# Patient Record
Sex: MALE | Race: WHITE | NOT HISPANIC OR LATINO | Employment: FULL TIME | URBAN - METROPOLITAN AREA
[De-identification: names, ages, dates, MRNs, and addresses within clinical notes are randomized per-mention and may not be internally consistent; named-entity substitution may affect disease eponyms.]

---

## 2024-04-03 ENCOUNTER — OFFICE VISIT (OUTPATIENT)
Dept: PRIMARY CARE | Facility: CLINIC | Age: 39
End: 2024-04-03
Payer: COMMERCIAL

## 2024-04-03 ENCOUNTER — LAB (OUTPATIENT)
Dept: LAB | Facility: LAB | Age: 39
End: 2024-04-03
Payer: COMMERCIAL

## 2024-04-03 VITALS
BODY MASS INDEX: 44.21 KG/M2 | WEIGHT: 315 LBS | DIASTOLIC BLOOD PRESSURE: 90 MMHG | SYSTOLIC BLOOD PRESSURE: 140 MMHG | TEMPERATURE: 96.8 F | HEART RATE: 81 BPM | OXYGEN SATURATION: 98 %

## 2024-04-03 DIAGNOSIS — Z13.228 SCREENING FOR METABOLIC DISORDER: ICD-10-CM

## 2024-04-03 DIAGNOSIS — Z00.00 ROUTINE ADULT HEALTH MAINTENANCE: ICD-10-CM

## 2024-04-03 DIAGNOSIS — Z13.0 SCREENING FOR DEFICIENCY ANEMIA: ICD-10-CM

## 2024-04-03 DIAGNOSIS — R74.8 ELEVATED LIVER ENZYMES: ICD-10-CM

## 2024-04-03 DIAGNOSIS — G47.30 SLEEP APNEA, UNSPECIFIED TYPE: ICD-10-CM

## 2024-04-03 DIAGNOSIS — E78.1 HYPERTRIGLYCERIDEMIA: ICD-10-CM

## 2024-04-03 DIAGNOSIS — Z00.00 ROUTINE ADULT HEALTH MAINTENANCE: Primary | ICD-10-CM

## 2024-04-03 DIAGNOSIS — Z11.59 NEED FOR HEPATITIS C SCREENING TEST: ICD-10-CM

## 2024-04-03 DIAGNOSIS — R03.0 ELEVATED BLOOD PRESSURE READING WITHOUT DIAGNOSIS OF HYPERTENSION: ICD-10-CM

## 2024-04-03 DIAGNOSIS — G47.10 HYPERSOMNIA: ICD-10-CM

## 2024-04-03 PROBLEM — F41.9 ANXIETY: Status: ACTIVE | Noted: 2024-04-03

## 2024-04-03 LAB
ALBUMIN SERPL-MCNC: 4.7 G/DL (ref 3.5–5)
ALP BLD-CCNC: 60 U/L (ref 35–125)
ALT SERPL-CCNC: 65 U/L (ref 5–40)
ANION GAP SERPL CALC-SCNC: 11 MMOL/L
AST SERPL-CCNC: 36 U/L (ref 5–40)
BILIRUB SERPL-MCNC: 0.4 MG/DL (ref 0.1–1.2)
BUN SERPL-MCNC: 10 MG/DL (ref 8–25)
CALCIUM SERPL-MCNC: 9.6 MG/DL (ref 8.5–10.4)
CHLORIDE SERPL-SCNC: 104 MMOL/L (ref 97–107)
CHOLEST SERPL-MCNC: 189 MG/DL (ref 133–200)
CHOLEST/HDLC SERPL: 5.1 {RATIO}
CO2 SERPL-SCNC: 25 MMOL/L (ref 24–31)
CREAT SERPL-MCNC: 0.8 MG/DL (ref 0.4–1.6)
EGFRCR SERPLBLD CKD-EPI 2021: >90 ML/MIN/1.73M*2
ERYTHROCYTE [DISTWIDTH] IN BLOOD BY AUTOMATED COUNT: 12.4 % (ref 11.5–14.5)
GLUCOSE SERPL-MCNC: 89 MG/DL (ref 65–99)
HCT VFR BLD AUTO: 45.4 % (ref 41–52)
HCV AB SER QL: NONREACTIVE
HDLC SERPL-MCNC: 37 MG/DL
HGB BLD-MCNC: 14.9 G/DL (ref 13.5–17.5)
LDLC SERPL CALC-MCNC: 103 MG/DL (ref 65–130)
MCH RBC QN AUTO: 29.4 PG (ref 26–34)
MCHC RBC AUTO-ENTMCNC: 32.8 G/DL (ref 32–36)
MCV RBC AUTO: 90 FL (ref 80–100)
NRBC BLD-RTO: 0 /100 WBCS (ref 0–0)
PLATELET # BLD AUTO: 259 X10*3/UL (ref 150–450)
POTASSIUM SERPL-SCNC: 4.7 MMOL/L (ref 3.4–5.1)
PROT SERPL-MCNC: 8 G/DL (ref 5.9–7.9)
RBC # BLD AUTO: 5.06 X10*6/UL (ref 4.5–5.9)
SODIUM SERPL-SCNC: 140 MMOL/L (ref 133–145)
TRIGL SERPL-MCNC: 246 MG/DL (ref 40–150)
WBC # BLD AUTO: 8.4 X10*3/UL (ref 4.4–11.3)

## 2024-04-03 PROCEDURE — 86803 HEPATITIS C AB TEST: CPT

## 2024-04-03 PROCEDURE — 80061 LIPID PANEL: CPT

## 2024-04-03 PROCEDURE — 85027 COMPLETE CBC AUTOMATED: CPT

## 2024-04-03 PROCEDURE — 99395 PREV VISIT EST AGE 18-39: CPT | Performed by: FAMILY MEDICINE

## 2024-04-03 PROCEDURE — 80053 COMPREHEN METABOLIC PANEL: CPT

## 2024-04-03 PROCEDURE — 1036F TOBACCO NON-USER: CPT | Performed by: FAMILY MEDICINE

## 2024-04-03 PROCEDURE — 36415 COLL VENOUS BLD VENIPUNCTURE: CPT

## 2024-04-03 ASSESSMENT — PATIENT HEALTH QUESTIONNAIRE - PHQ9
1. LITTLE INTEREST OR PLEASURE IN DOING THINGS: NOT AT ALL
2. FEELING DOWN, DEPRESSED OR HOPELESS: NOT AT ALL
SUM OF ALL RESPONSES TO PHQ9 QUESTIONS 1 AND 2: 0

## 2024-04-03 ASSESSMENT — PROMIS GLOBAL HEALTH SCALE
CARRYOUT_SOCIAL_ACTIVITIES: GOOD
RATE_AVERAGE_PAIN: 3
RATE_SOCIAL_SATISFACTION: GOOD
RATE_QUALITY_OF_LIFE: GOOD
RATE_GENERAL_HEALTH: GOOD
CARRYOUT_PHYSICAL_ACTIVITIES: MOSTLY
EMOTIONAL_PROBLEMS: OFTEN
RATE_AVERAGE_FATIGUE: MODERATE
RATE_PHYSICAL_HEALTH: FAIR
RATE_MENTAL_HEALTH: FAIR

## 2024-04-03 ASSESSMENT — PAIN SCALES - GENERAL: PAINLEVEL: 0-NO PAIN

## 2024-04-03 NOTE — ASSESSMENT & PLAN NOTE
- Blood pressure had a high range of goal in office today  -Discussed focus on lifestyle modification in the form of healthy, balanced diet with increased physical activity with goal of weight loss  -Encouraged moderation of salt/caffeine/alcohol  -Recommend obtaining home blood pressure cuff with plans to monitor and contact office if readings are consistently above 140/90

## 2024-04-03 NOTE — ASSESSMENT & PLAN NOTE
- Will assess cholesterol panel secondary to previous elevated triglycerides  -Continue to focus on healthy, low-fat diet with moderation of carbohydrates

## 2024-04-03 NOTE — PATIENT INSTRUCTIONS
Problem List Items Addressed This Visit             ICD-10-CM    Hypersomnia G47.10    Relevant Orders    Referral to Adult Sleep Medicine    Sleep apnea G47.30     - Secondary to prior history, recommend having new consultation/sleep study with referral given         Relevant Orders    Referral to Adult Sleep Medicine    Hypertriglyceridemia E78.1     - Will assess cholesterol panel secondary to previous elevated triglycerides  -Continue to focus on healthy, low-fat diet with moderation of carbohydrates         Relevant Orders    Lipid Panel    Comprehensive Metabolic Panel    Elevated liver enzymes R74.8     - Will monitor liver enzymes with blood work ordered secondary to previous mild elevation         Relevant Orders    Lipid Panel    Comprehensive Metabolic Panel    Elevated blood pressure reading without diagnosis of hypertension R03.0     - Blood pressure had a high range of goal in office today  -Discussed focus on lifestyle modification in the form of healthy, balanced diet with increased physical activity with goal of weight loss  -Encouraged moderation of salt/caffeine/alcohol  -Recommend obtaining home blood pressure cuff with plans to monitor and contact office if readings are consistently above 140/90          Other Visit Diagnoses         Codes    Routine adult health maintenance    -  Primary Z00.00    Relevant Orders    Lipid Panel    Comprehensive Metabolic Panel    CBC    Hepatitis C antibody    Screening for deficiency anemia     Z13.0    Relevant Orders    CBC    Screening for metabolic disorder     Z13.228    Relevant Orders    Lipid Panel    Comprehensive Metabolic Panel    Need for hepatitis C screening test     Z11.59    Relevant Orders    Hepatitis C antibody            Additional Visit Plans:  - Complete history and physical examination was performed    GENERAL RECOMMENDATIONS:  - Complete review of history of physical exam completed today  - A healthy diet to maintain a normal BMI (under  25) to reduce heart disease, risk for diabetes encouraged.  - Exercising 150 minutes per week and eating healthy to reduce heart disease.  - Blood pressure screen completed.    BLOOD TESTING:  - Orders for fasting routine blood work given today, to be completed at your earliest convenience  - Will contact you with the blood work results once received and reviewed.      General Recommendations include:  - Cholesterol and diabetes screen if risk factors (overweight, high blood pressure).  - Sexually transmitted infections if risk factors.  - Hepatitis C virus screen for all adults    VACCINATIONS RECOMMENDATIONS:  - Flu shot annually - advocated seasonally  - Tetanus booster every 10 years - advocated  - Pneumonia vaccination starting at 65 years old (or earlier if risk factors - smoker, diabetic, heart or lung conditions) -not due yet  - Shingles vaccine for those 50 years or older - check with your insurance for SHINGRIX coverage and get it at your local pharmacy -not due yet  -COVID-19 vaccine series advocated    SCREENINGS RECOMMENDATIONS:  -Colon cancer screening (with colonoscopy or Cologuard) for men and women starting at age 45 until 74 years old - not due yet    Counseling:       Medication education:         Education:  The patient is counseled regarding potential side-effects of all new medications        Understanding:  Patient expressed understanding        Adherence:  No barriers to adherence identified

## 2024-04-03 NOTE — PROGRESS NOTES
Outpatient Visit Note    Chief Complaint   Patient presents with    Annual Exam     Pt here for physical exam.        HPI:  Kris Smith is a 38 y.o. male who presents to the office for annual well exam.  He was last seen on 12/17/2021 for annual well exam.  He presents today with biometric health screening form to be completed.    Last panel blood work completed in December 2021.  CBC, CMP, lipid panel and TSH, to which blood work was remarkable for mild hypertriglyceridemia and mildly elevated liver enzymes.  Healthy balanced diet/lifestyle modification was advocated.    Well exam:  Overall, he describes his health as fair with no reports of recent illness or hospitalization. He states that his diet is fair, adhering to no specific dietary regimen.  Weight has remained relatively unchanged, with 3 pound weight gain since 2021.  In regards to physical activity, he does walk over 20,000 steps a day, being very active in his job. He denies any significant sleep complaints, though he does have a history of mild sleep apnea. Had a sleep study performed many years ago secondary to witnessed apneic spells. At that time they said he was mild/borderline sleep apnea though not to the point requiring any intervention. Does admit to hypersomnolence throughout the day. He denies issues of chest pain, shortness of breath, headaches, vision/hearing changes, abdominal pain, vomiting, diarrhea, melena, hematochezia, constipation or urinary symptoms.           Separately, patient does express past history of generalized anxiety, with prominent social anxiety. Noted that symptoms have worsened throughout the COVID-19 pandemic.  Was given mental health specialist referral at last encounter.  Overall he denies any specific major depressive episodes, anxiety attacks, anhedonia, SI/HI.    Preventative Health Maintenance:  In regards to preventative health maintenance, last Tdap received unknown. Flu shot not typically  received stating to have had an adverse reaction from the flu shot received in his teenage years. COVID-19 vaccine not received to date.    Current Medications  No current outpatient medications     Allergies  No Known Allergies     Immunizations    There is no immunization history on file for this patient.     History reviewed. No pertinent past medical history.   History reviewed. No pertinent surgical history.  No family history on file.  Social History     Tobacco Use    Smoking status: Former     Types: Cigarettes     Quit date:      Years since quittin.2    Smokeless tobacco: Never   Substance Use Topics    Alcohol use: Not Currently     Comment: Occasionally    Drug use: Never       ROS  All pertinent positive symptoms are included in the history of present illness.  All other systems have been reviewed and are negative and noncontributory to this patient's current ailments.    VITAL SIGNS  Vitals:    24 1008   BP: 140/90   Pulse: 81   Temp: 36 °C (96.8 °F)   SpO2: 98%       PHYSICAL EXAM  GENERAL APPEARANCE: alert and oriented, Pleasant and cooperative, No Acute Distress.   HEENT: EOMI, PERRLA, TMs intact and flat bilaterally, patent nares, normal oropharynx, MMM  NECK: no lymphadenopathy, no thyromegaly.   HEART: RRR, normal S1S2, no murmurs, click or rubs.   LUNGS: clear to auscultation bilaterally, no wheezes/rhonchi/rales.   ABDOMEN: soft, non-tender, no organomegaly, no masses palpated, no guarding or rigidity.   EXTREMITIES: no edema, normal ROM  SKIN: normal, no rash, unremarkable.   NEUROLOGIC EXAM: non-focal exam.   MUSCULOSKELETAL: no gross abnormalities.   PSYCH: affect is normal, eye contact is good.     Assessment/Plan   Problem List Items Addressed This Visit             ICD-10-CM    Hypersomnia G47.10    Relevant Orders    Referral to Adult Sleep Medicine    Sleep apnea G47.30     - Secondary to prior history, recommend having new consultation/sleep study with referral given          Relevant Orders    Referral to Adult Sleep Medicine    Hypertriglyceridemia E78.1     - Will assess cholesterol panel secondary to previous elevated triglycerides  -Continue to focus on healthy, low-fat diet with moderation of carbohydrates         Relevant Orders    Lipid Panel    Comprehensive Metabolic Panel    Elevated liver enzymes R74.8     - Will monitor liver enzymes with blood work ordered secondary to previous mild elevation         Relevant Orders    Lipid Panel    Comprehensive Metabolic Panel    Elevated blood pressure reading without diagnosis of hypertension R03.0     - Blood pressure had a high range of goal in office today  -Discussed focus on lifestyle modification in the form of healthy, balanced diet with increased physical activity with goal of weight loss  -Encouraged moderation of salt/caffeine/alcohol  -Recommend obtaining home blood pressure cuff with plans to monitor and contact office if readings are consistently above 140/90          Other Visit Diagnoses         Codes    Routine adult health maintenance    -  Primary Z00.00    Relevant Orders    Lipid Panel    Comprehensive Metabolic Panel    CBC    Hepatitis C antibody    Screening for deficiency anemia     Z13.0    Relevant Orders    CBC    Screening for metabolic disorder     Z13.228    Relevant Orders    Lipid Panel    Comprehensive Metabolic Panel    Need for hepatitis C screening test     Z11.59    Relevant Orders    Hepatitis C antibody            Additional Visit Plans:  - Complete history and physical examination was performed    GENERAL RECOMMENDATIONS:  - Complete review of history of physical exam completed today  - A healthy diet to maintain a normal BMI (under 25) to reduce heart disease, risk for diabetes encouraged.  - Exercising 150 minutes per week and eating healthy to reduce heart disease.  - Blood pressure screen completed.    BLOOD TESTING:  - Orders for fasting routine blood work given today, to be completed  at your earliest convenience  - Will contact you with the blood work results once received and reviewed.      General Recommendations include:  - Cholesterol and diabetes screen if risk factors (overweight, high blood pressure).  - Sexually transmitted infections if risk factors.  - Hepatitis C virus screen for all adults    VACCINATIONS RECOMMENDATIONS:  - Flu shot annually - advocated seasonally  - Tetanus booster every 10 years - advocated  - Pneumonia vaccination starting at 65 years old (or earlier if risk factors - smoker, diabetic, heart or lung conditions) -not due yet  - Shingles vaccine for those 50 years or older - check with your insurance for SHINGRIX coverage and get it at your local pharmacy -not due yet  -COVID-19 vaccine series advocated    SCREENINGS RECOMMENDATIONS:  -Colon cancer screening (with colonoscopy or Cologuard) for men and women starting at age 45 until 74 years old - not due yet    Counseling:       Medication education:         Education:  The patient is counseled regarding potential side-effects of all new medications        Understanding:  Patient expressed understanding        Adherence:  No barriers to adherence identified

## 2024-07-31 ENCOUNTER — OFFICE VISIT (OUTPATIENT)
Dept: PRIMARY CARE | Facility: CLINIC | Age: 39
End: 2024-07-31
Payer: COMMERCIAL

## 2024-07-31 VITALS
SYSTOLIC BLOOD PRESSURE: 132 MMHG | HEART RATE: 78 BPM | DIASTOLIC BLOOD PRESSURE: 88 MMHG | HEIGHT: 70 IN | WEIGHT: 315 LBS | TEMPERATURE: 95.8 F | BODY MASS INDEX: 45.1 KG/M2 | OXYGEN SATURATION: 97 %

## 2024-07-31 DIAGNOSIS — M25.521 RIGHT ELBOW PAIN: ICD-10-CM

## 2024-07-31 DIAGNOSIS — B35.6 TINEA CRURIS: ICD-10-CM

## 2024-07-31 DIAGNOSIS — Z78.9 WEIGHT LOSS ADVISED: ICD-10-CM

## 2024-07-31 PROCEDURE — 3008F BODY MASS INDEX DOCD: CPT | Performed by: FAMILY MEDICINE

## 2024-07-31 PROCEDURE — 99214 OFFICE O/P EST MOD 30 MIN: CPT | Performed by: FAMILY MEDICINE

## 2024-07-31 RX ORDER — NYSTATIN 100000 [USP'U]/G
1 POWDER TOPICAL 2 TIMES DAILY
Qty: 60 G | Refills: 2 | Status: SHIPPED | OUTPATIENT
Start: 2024-07-31 | End: 2025-07-31

## 2024-07-31 RX ORDER — DICLOFENAC SODIUM 75 MG/1
75 TABLET, DELAYED RELEASE ORAL 2 TIMES DAILY PRN
Qty: 60 TABLET | Refills: 0 | Status: SHIPPED | OUTPATIENT
Start: 2024-07-31 | End: 2024-09-29

## 2024-07-31 ASSESSMENT — PAIN SCALES - GENERAL: PAINLEVEL: 3

## 2024-07-31 ASSESSMENT — PATIENT HEALTH QUESTIONNAIRE - PHQ9
SUM OF ALL RESPONSES TO PHQ9 QUESTIONS 1 AND 2: 0
1. LITTLE INTEREST OR PLEASURE IN DOING THINGS: NOT AT ALL
2. FEELING DOWN, DEPRESSED OR HOPELESS: NOT AT ALL

## 2024-07-31 NOTE — PATIENT INSTRUCTIONS
Problem List Items Addressed This Visit             ICD-10-CM    Adult BMI 45.0-49.9 kg/sq m (Multi) - Primary Z68.42     - With ongoing weight struggles despite attempts at lifestyle modification, will plan to initiate potential trial of weight loss medication as well as set up for possible consultation with bariatrics  -Continue to focus on healthy, low-fat diet with moderation of carbohydrates/calories and regular physical activity         Relevant Medications    tirzepatide, weight loss, (Zepbound) 2.5 mg/0.5 mL injection    Other Relevant Orders    Referral to Bariatric Surgery    Tinea cruris B35.6     - Will try nystatin powder which can often have wicking affect         Relevant Medications    nystatin (Mycostatin) 100,000 unit/gram powder    Right elbow pain M25.521     - Symptoms seem musculoskeletal in nature to which we will attempt trial of topical anti-inflammatory Voltaren along with supportive care including ice/heat and rest         Relevant Medications    diclofenac (Voltaren) 75 mg EC tablet     Other Visit Diagnoses         Codes    Weight loss advised     Z78.9    Relevant Medications    tirzepatide, weight loss, (Zepbound) 2.5 mg/0.5 mL injection            Counseling:       Medication education:         Education:  The patient is counseled regarding potential side-effects of all new medications        Understanding:  Patient expressed understanding        Adherence:  No barriers to adherence identified    ** Please excuse any errors in grammar or translation related to this dictation. Voice recognition software was utilized to prepare this document. **

## 2024-07-31 NOTE — PROGRESS NOTES
Outpatient Visit Note    Chief Complaint   Patient presents with    Weight Loss     Discuss weight loss options         HPI:  Krsi Smith is a 38 y.o. male who presents to the office to discuss weight loss surgery options.  He was last seen in the office on 4/3/2024 for annual well exam.  Prior to this he had been seen on 12/17/2021 for annual well exam.      Last panel blood work completed on 4/3/2024 including CBC, CMP, lipid panel and hepatitis C screening.  Blood work was remarkable for mildly elevated ALT level and hypertriglyceridemia to which patient was encouraged to diligently focus on low-fat diet with moderation of carbohydrates    At well exam he described his health as fair with no reports of recent illness or hospitalization. He stated that his diet was fair, denying adherence to any specific dietary regimen.  Weight had remained relatively unchanged, with 3 pound weight gain since 2021.  In regards to physical activity, he does walk over 20,000 steps a day, being very active in his job though he was not able to have any significant success with weight loss. He denied any significant sleep complaints, though he does have a history of mild sleep apnea. Had a sleep study performed many years ago secondary to witnessed apneic spells. At that time they said he was mild/borderline sleep apnea though not to the point requiring any intervention.  Admitted to hypersomnolence throughout the day, to which recommendation was given to undergo formal sleep assessment.     Today he reports ongoing struggles with his weight despite attempts at dietary modification.  States that this is contributed towards significant social anxiety.  He continues to be very physically active though this does not seem to have significant impacts on his goals of wanting to lose weight.  Expresses interest in pursuing options such as medical weight loss as well as consultation for bariatric surgery.    Of note, patient has  been dealing with recurrent fungal infection in his groin area.  Has attempted over-the-counter topicals with no significant relief.  He also notes recurrent right elbow pain stating to have likely overused the joint with persisting discomfort secondary to repetitive activities.    Preventative Health Maintenance:  In regards to preventative health maintenance, last Tdap received unknown. Flu shot not typically received stating to have had an adverse reaction from the flu shot received in his teenage years. COVID-19 vaccine not received to date.      Current Medications  Current Outpatient Medications   Medication Instructions    diclofenac (VOLTAREN) 75 mg, oral, 2 times daily PRN, Do not crush, chew, or split.    nystatin (Mycostatin) 100,000 unit/gram powder 1 Application, Topical, 2 times daily    tirzepatide (weight loss) (ZEPBOUND) 2.5 mg, subcutaneous, Every 7 days        Allergies  No Known Allergies     History reviewed. No pertinent past medical history.   History reviewed. No pertinent surgical history.  No family history on file.  Social History     Tobacco Use    Smoking status: Former     Current packs/day: 0.00     Types: Cigarettes     Quit date:      Years since quittin.5    Smokeless tobacco: Never   Vaping Use    Vaping status: Never Used   Substance Use Topics    Alcohol use: Not Currently     Comment: Occasionally    Drug use: Never       ROS  All pertinent positive symptoms are included in the history of present illness.  All other systems have been reviewed and are negative and noncontributory to this patient's current ailments.      VITAL SIGNS  Vitals:    24 1052   BP: 132/88   Pulse: 78   Temp: 35.4 °C (95.8 °F)   SpO2: 97%       PHYSICAL EXAM  GENERAL APPEARANCE: alert and oriented, Pleasant and cooperative, No Acute Distress  HEENT: EOMI, PERRLA, MMM  HEART: RRR, normal S1S2, no murmurs, click or rubs  LUNGS: clear to auscultation bilaterally, no  wheezes/rhonchi/rales  EXTREMITIES: no edema, normal ROM  SKIN: normal, no rash, unremarkable  NEUROLOGIC EXAM: non-focal exam  MUSCULOSKELETAL: no gross abnormalities  PSYCH: affect is normal, eye contact is good    Assessment/Plan   Problem List Items Addressed This Visit             ICD-10-CM    Adult BMI 45.0-49.9 kg/sq m (Multi) - Primary Z68.42     - With ongoing weight struggles despite attempts at lifestyle modification, will plan to initiate potential trial of weight loss medication as well as set up for possible consultation with bariatrics  -Continue to focus on healthy, low-fat diet with moderation of carbohydrates/calories and regular physical activity         Relevant Medications    tirzepatide, weight loss, (Zepbound) 2.5 mg/0.5 mL injection    Other Relevant Orders    Referral to Bariatric Surgery    Tinea cruris B35.6     - Will try nystatin powder which can often have wicking affect         Relevant Medications    nystatin (Mycostatin) 100,000 unit/gram powder    Right elbow pain M25.521     - Symptoms seem musculoskeletal in nature to which we will attempt trial of topical anti-inflammatory Voltaren along with supportive care including ice/heat and rest         Relevant Medications    diclofenac (Voltaren) 75 mg EC tablet     Other Visit Diagnoses         Codes    Weight loss advised     Z78.9    Relevant Medications    tirzepatide, weight loss, (Zepbound) 2.5 mg/0.5 mL injection            Counseling:       Medication education:         Education:  The patient is counseled regarding potential side-effects of all new medications        Understanding:  Patient expressed understanding        Adherence:  No barriers to adherence identified    ** Please excuse any errors in grammar or translation related to this dictation. Voice recognition software was utilized to prepare this document. **

## 2024-08-01 ENCOUNTER — TELEPHONE (OUTPATIENT)
Dept: SURGERY | Facility: CLINIC | Age: 39
End: 2024-08-01
Payer: COMMERCIAL

## 2024-08-01 PROBLEM — B35.6 TINEA CRURIS: Status: ACTIVE | Noted: 2024-08-01

## 2024-08-01 PROBLEM — M25.521 RIGHT ELBOW PAIN: Status: ACTIVE | Noted: 2024-08-01

## 2024-08-01 NOTE — ASSESSMENT & PLAN NOTE
- Symptoms seem musculoskeletal in nature to which we will attempt trial of topical anti-inflammatory Voltaren along with supportive care including ice/heat and rest

## 2024-08-01 NOTE — ASSESSMENT & PLAN NOTE
- With ongoing weight struggles despite attempts at lifestyle modification, will plan to initiate potential trial of weight loss medication as well as set up for possible consultation with bariatrics  -Continue to focus on healthy, low-fat diet with moderation of carbohydrates/calories and regular physical activity

## 2024-08-08 ENCOUNTER — TELEPHONE (OUTPATIENT)
Dept: SURGERY | Facility: CLINIC | Age: 39
End: 2024-08-08
Payer: COMMERCIAL

## 2024-08-28 ENCOUNTER — OFFICE VISIT (OUTPATIENT)
Dept: PRIMARY CARE | Facility: CLINIC | Age: 39
End: 2024-08-28
Payer: COMMERCIAL

## 2024-08-28 VITALS
OXYGEN SATURATION: 97 % | HEART RATE: 83 BPM | TEMPERATURE: 96.4 F | WEIGHT: 315 LBS | HEIGHT: 70 IN | SYSTOLIC BLOOD PRESSURE: 122 MMHG | DIASTOLIC BLOOD PRESSURE: 72 MMHG | BODY MASS INDEX: 45.1 KG/M2

## 2024-08-28 DIAGNOSIS — E66.01 CLASS 3 SEVERE OBESITY WITHOUT SERIOUS COMORBIDITY WITH BODY MASS INDEX (BMI) OF 45.0 TO 49.9 IN ADULT, UNSPECIFIED OBESITY TYPE (MULTI): Primary | ICD-10-CM

## 2024-08-28 DIAGNOSIS — Z78.9 WEIGHT LOSS ADVISED: ICD-10-CM

## 2024-08-28 PROCEDURE — 99214 OFFICE O/P EST MOD 30 MIN: CPT | Performed by: FAMILY MEDICINE

## 2024-08-28 PROCEDURE — 1036F TOBACCO NON-USER: CPT | Performed by: FAMILY MEDICINE

## 2024-08-28 PROCEDURE — 3008F BODY MASS INDEX DOCD: CPT | Performed by: FAMILY MEDICINE

## 2024-08-28 ASSESSMENT — PAIN SCALES - GENERAL: PAINLEVEL: 0-NO PAIN

## 2024-08-28 NOTE — PROGRESS NOTES
Outpatient Visit Note    Chief Complaint   Patient presents with    Follow-up     Zepbound f/u         HPI:  Kris Smith is a 38 y.o. male who presents to the office for medication follow-up.  He was last seen in the office on 7/31/2024 to discuss medical weight loss options.    Last panel blood work completed on 4/3/2024 including CBC, CMP, lipid panel and hepatitis C screening.  Blood work was remarkable for mildly elevated ALT level and hypertriglyceridemia to which patient was encouraged to diligently focus on low-fat diet with moderation of carbohydrates    At well exam he described his health as fair with no reports of recent illness or hospitalization. He stated that his diet was fair, denying adherence to any specific dietary regimen.  Weight had remained relatively unchanged, with 3 pound weight gain since 2021.  In regards to physical activity, he does walk over 20,000 steps a day, being very active in his job though he was not able to have any significant success with weight loss. He denied any significant sleep complaints, though he does have a history of mild sleep apnea. Had a sleep study performed many years ago secondary to witnessed apneic spells. At that time they said he was mild/borderline sleep apnea though not to the point requiring any intervention.  Admitted to hypersomnolence throughout the day, to which recommendation was given to undergo formal sleep assessment.     At last encounter he reported ongoing struggles with his weight despite attempts at dietary modification.  Stated that this is contributed towards significant social anxiety.  He continued to be very physically active though this did not seem to have significant impacts on his goals of wanting to lose weight.  Expressed interest in pursuing options such as medical weight loss as well as consultation for bariatric surgery, with referral given.  Plan was ultimately sent to initiate medical weight loss therapy in the  form of Zepbound.  He reports to have had good success with starting medication denying any abdominal pain or nausea.  Notes improvement in his appetite with cravings curved.  Does feel that the medication wears off after several days to which he is interested in increasing dose.  Has continue to focus on dietary modification.  11 pound weight loss noted since last visit    Preventative Health Maintenance:  In regards to preventative health maintenance, last Tdap received unknown. Flu shot not typically received stating to have had an adverse reaction from the flu shot received in his teenage years. COVID-19 vaccine not received to date.      Current Medications  Current Outpatient Medications   Medication Instructions    diclofenac (VOLTAREN) 75 mg, oral, 2 times daily PRN, Do not crush, chew, or split.    nystatin (Mycostatin) 100,000 unit/gram powder 1 Application, Topical, 2 times daily    tirzepatide (weight loss) (ZEPBOUND) 2.5 mg, subcutaneous, Every 7 days    tirzepatide (weight loss) (ZEPBOUND) 5 mg, subcutaneous, Every 7 days        Allergies  No Known Allergies     Past Medical History:   Diagnosis Date    Anxiety     Depression     Hypertension       History reviewed. No pertinent surgical history.  Family History   Problem Relation Name Age of Onset    Cancer Mother Zoila Smith     Hypertension Mother Zoila Smith     COPD Father Andrzej Smith     Depression Father Andrzej Smith      Social History     Tobacco Use    Smoking status: Former     Current packs/day: 0.00     Average packs/day: 1 pack/day for 10.0 years (10.0 ttl pk-yrs)     Types: Cigarettes     Start date: 1/1/2003     Quit date: 12/1/2013     Years since quitting: 10.7    Smokeless tobacco: Never   Vaping Use    Vaping status: Never Used   Substance Use Topics    Alcohol use: Yes     Alcohol/week: 6.0 standard drinks of alcohol     Types: 4 Cans of beer, 2 Shots of liquor per week     Comment: Occasionally    Drug use: Never        ROS  All pertinent positive symptoms are included in the history of present illness.  All other systems have been reviewed and are negative and noncontributory to this patient's current ailments.      VITAL SIGNS  Vitals:    08/28/24 1137   BP: 122/72   Pulse: 83   Temp: 35.8 °C (96.4 °F)   SpO2: 97%         PHYSICAL EXAM  GENERAL APPEARANCE: alert and oriented, Pleasant and cooperative, No Acute Distress  HEENT: EOMI, PERRLA, MMM  EXTREMITIES: no edema, normal ROM  SKIN: normal, no rash, unremarkable  NEUROLOGIC EXAM: non-focal exam  MUSCULOSKELETAL: no gross abnormalities  PSYCH: affect is normal, eye contact is good    Assessment/Plan   Problem List Items Addressed This Visit             ICD-10-CM    Adult BMI 45.0-49.9 kg/sq m (Multi) Z68.42     - Very happy to hear that the medication has been tolerated thus far to which we will plan to increase the next dose  -Please contact office after 3 weeks of new dose to let us know how you are doing and see if further dose adjustments are to be made.  If there are any adverse side effects or concerns, please do not hesitate to reach out  -Continue to focus on healthy, balanced diet with moderation of carbohydrates and regular physical activity         Relevant Medications    tirzepatide, weight loss, (Zepbound) 5 mg/0.5 mL injection    Class 3 severe obesity without serious comorbidity with body mass index (BMI) of 45.0 to 49.9 in adult (Multi) - Primary E66.01, Z68.42     Other Visit Diagnoses         Codes    Weight loss advised     Z78.9    Relevant Medications    tirzepatide, weight loss, (Zepbound) 5 mg/0.5 mL injection            Counseling:       Medication education:         Education:  The patient is counseled regarding potential side-effects of all new medications        Understanding:  Patient expressed understanding        Adherence:  No barriers to adherence identified    ** Please excuse any errors in grammar or translation related to this dictation.  Voice recognition software was utilized to prepare this document. **

## 2024-08-28 NOTE — ASSESSMENT & PLAN NOTE
- Very happy to hear that the medication has been tolerated thus far to which we will plan to increase the next dose  -Please contact office after 3 weeks of new dose to let us know how you are doing and see if further dose adjustments are to be made.  If there are any adverse side effects or concerns, please do not hesitate to reach out  -Continue to focus on healthy, balanced diet with moderation of carbohydrates and regular physical activity

## 2024-08-29 PROBLEM — E66.01 CLASS 3 SEVERE OBESITY WITHOUT SERIOUS COMORBIDITY WITH BODY MASS INDEX (BMI) OF 45.0 TO 49.9 IN ADULT (MULTI): Status: ACTIVE | Noted: 2024-08-29

## 2024-08-29 PROBLEM — E66.813 CLASS 3 SEVERE OBESITY WITHOUT SERIOUS COMORBIDITY WITH BODY MASS INDEX (BMI) OF 45.0 TO 49.9 IN ADULT: Status: ACTIVE | Noted: 2024-08-29

## 2024-09-19 ENCOUNTER — PATIENT MESSAGE (OUTPATIENT)
Dept: PRIMARY CARE | Facility: CLINIC | Age: 39
End: 2024-09-19
Payer: COMMERCIAL

## 2024-09-19 DIAGNOSIS — Z78.9 WEIGHT LOSS ADVISED: ICD-10-CM

## 2024-10-18 ENCOUNTER — PATIENT MESSAGE (OUTPATIENT)
Dept: PRIMARY CARE | Facility: CLINIC | Age: 39
End: 2024-10-18
Payer: COMMERCIAL

## 2024-10-18 DIAGNOSIS — Z13.21 ENCOUNTER FOR VITAMIN DEFICIENCY SCREENING: ICD-10-CM

## 2024-10-18 DIAGNOSIS — Z13.228 SCREENING FOR METABOLIC DISORDER: ICD-10-CM

## 2024-10-18 DIAGNOSIS — E78.1 HYPERTRIGLYCERIDEMIA: Primary | ICD-10-CM

## 2024-10-18 DIAGNOSIS — Z13.0 SCREENING FOR DEFICIENCY ANEMIA: ICD-10-CM

## 2024-10-19 ENCOUNTER — LAB (OUTPATIENT)
Dept: LAB | Facility: LAB | Age: 39
End: 2024-10-19
Payer: COMMERCIAL

## 2024-10-19 DIAGNOSIS — Z13.228 SCREENING FOR METABOLIC DISORDER: ICD-10-CM

## 2024-10-19 DIAGNOSIS — Z13.21 ENCOUNTER FOR VITAMIN DEFICIENCY SCREENING: ICD-10-CM

## 2024-10-19 DIAGNOSIS — E78.1 HYPERTRIGLYCERIDEMIA: ICD-10-CM

## 2024-10-19 LAB
ALBUMIN SERPL BCP-MCNC: 4.5 G/DL (ref 3.4–5)
ALP SERPL-CCNC: 51 U/L (ref 33–120)
ALT SERPL W P-5'-P-CCNC: 34 U/L (ref 10–52)
ANION GAP SERPL CALCULATED.3IONS-SCNC: 9 MMOL/L (ref 10–20)
AST SERPL W P-5'-P-CCNC: 19 U/L (ref 9–39)
BILIRUB SERPL-MCNC: 0.6 MG/DL (ref 0–1.2)
BUN SERPL-MCNC: 7 MG/DL (ref 6–23)
CALCIUM SERPL-MCNC: 9.3 MG/DL (ref 8.6–10.3)
CHLORIDE SERPL-SCNC: 107 MMOL/L (ref 98–107)
CO2 SERPL-SCNC: 27 MMOL/L (ref 21–32)
CREAT SERPL-MCNC: 0.74 MG/DL (ref 0.5–1.3)
EGFRCR SERPLBLD CKD-EPI 2021: >90 ML/MIN/1.73M*2
GLUCOSE SERPL-MCNC: 84 MG/DL (ref 74–99)
LIPASE SERPL-CCNC: 48 U/L (ref 9–82)
POTASSIUM SERPL-SCNC: 4.3 MMOL/L (ref 3.5–5.3)
PROT SERPL-MCNC: 7.2 G/DL (ref 6.4–8.2)
SODIUM SERPL-SCNC: 139 MMOL/L (ref 136–145)
TSH SERPL-ACNC: 1.2 MIU/L (ref 0.44–3.98)
VIT B12 SERPL-MCNC: 377 PG/ML (ref 211–911)

## 2024-10-19 PROCEDURE — 80053 COMPREHEN METABOLIC PANEL: CPT

## 2024-10-19 PROCEDURE — 36415 COLL VENOUS BLD VENIPUNCTURE: CPT

## 2024-10-19 PROCEDURE — 84443 ASSAY THYROID STIM HORMONE: CPT

## 2024-10-19 PROCEDURE — 83690 ASSAY OF LIPASE: CPT

## 2024-10-19 PROCEDURE — 82607 VITAMIN B-12: CPT

## 2024-11-15 ENCOUNTER — PATIENT MESSAGE (OUTPATIENT)
Dept: PRIMARY CARE | Facility: CLINIC | Age: 39
End: 2024-11-15
Payer: COMMERCIAL

## 2024-11-15 DIAGNOSIS — E66.01 CLASS 3 SEVERE OBESITY WITHOUT SERIOUS COMORBIDITY WITH BODY MASS INDEX (BMI) OF 45.0 TO 49.9 IN ADULT, UNSPECIFIED OBESITY TYPE: Primary | ICD-10-CM

## 2024-11-15 DIAGNOSIS — E66.813 CLASS 3 SEVERE OBESITY WITHOUT SERIOUS COMORBIDITY WITH BODY MASS INDEX (BMI) OF 45.0 TO 49.9 IN ADULT, UNSPECIFIED OBESITY TYPE: Primary | ICD-10-CM

## 2024-11-15 DIAGNOSIS — Z78.9 WEIGHT LOSS ADVISED: ICD-10-CM

## 2024-12-16 ENCOUNTER — PATIENT MESSAGE (OUTPATIENT)
Dept: PRIMARY CARE | Facility: CLINIC | Age: 39
End: 2024-12-16
Payer: COMMERCIAL

## 2024-12-16 DIAGNOSIS — L20.9 ATOPIC DERMATITIS, UNSPECIFIED TYPE: ICD-10-CM

## 2024-12-16 DIAGNOSIS — Z78.9 WEIGHT LOSS ADVISED: ICD-10-CM

## 2024-12-16 DIAGNOSIS — E66.01 CLASS 3 SEVERE OBESITY WITHOUT SERIOUS COMORBIDITY WITH BODY MASS INDEX (BMI) OF 45.0 TO 49.9 IN ADULT, UNSPECIFIED OBESITY TYPE: ICD-10-CM

## 2024-12-16 DIAGNOSIS — E66.813 CLASS 3 SEVERE OBESITY WITHOUT SERIOUS COMORBIDITY WITH BODY MASS INDEX (BMI) OF 45.0 TO 49.9 IN ADULT, UNSPECIFIED OBESITY TYPE: ICD-10-CM

## 2024-12-16 DIAGNOSIS — K21.9 GASTROESOPHAGEAL REFLUX DISEASE, UNSPECIFIED WHETHER ESOPHAGITIS PRESENT: ICD-10-CM

## 2024-12-16 RX ORDER — MAG HYDROX/ALUMINUM HYD/SIMETH 200-200-20
SUSPENSION, ORAL (FINAL DOSE FORM) ORAL 2 TIMES DAILY
Qty: 56 G | Refills: 0 | Status: SHIPPED | OUTPATIENT
Start: 2024-12-16 | End: 2025-01-13

## 2024-12-16 RX ORDER — PANTOPRAZOLE SODIUM 40 MG/1
40 TABLET, DELAYED RELEASE ORAL DAILY
Qty: 90 TABLET | Refills: 0 | Status: SHIPPED | OUTPATIENT
Start: 2024-12-16 | End: 2025-03-16

## 2025-01-06 ENCOUNTER — TELEPHONE (OUTPATIENT)
Dept: PRIMARY CARE | Facility: CLINIC | Age: 40
End: 2025-01-06
Payer: COMMERCIAL

## 2025-01-17 ENCOUNTER — PATIENT MESSAGE (OUTPATIENT)
Dept: PRIMARY CARE | Facility: CLINIC | Age: 40
End: 2025-01-17
Payer: COMMERCIAL

## 2025-01-17 DIAGNOSIS — E66.01 CLASS 3 SEVERE OBESITY WITHOUT SERIOUS COMORBIDITY WITH BODY MASS INDEX (BMI) OF 45.0 TO 49.9 IN ADULT, UNSPECIFIED OBESITY TYPE: ICD-10-CM

## 2025-01-17 DIAGNOSIS — Z78.9 WEIGHT LOSS ADVISED: ICD-10-CM

## 2025-01-17 DIAGNOSIS — E66.813 CLASS 3 SEVERE OBESITY WITHOUT SERIOUS COMORBIDITY WITH BODY MASS INDEX (BMI) OF 45.0 TO 49.9 IN ADULT, UNSPECIFIED OBESITY TYPE: ICD-10-CM

## 2025-02-12 ENCOUNTER — PATIENT MESSAGE (OUTPATIENT)
Dept: PRIMARY CARE | Facility: CLINIC | Age: 40
End: 2025-02-12
Payer: COMMERCIAL

## 2025-02-12 DIAGNOSIS — E66.813 CLASS 3 SEVERE OBESITY WITHOUT SERIOUS COMORBIDITY WITH BODY MASS INDEX (BMI) OF 45.0 TO 49.9 IN ADULT, UNSPECIFIED OBESITY TYPE: ICD-10-CM

## 2025-02-12 DIAGNOSIS — E66.01 CLASS 3 SEVERE OBESITY WITHOUT SERIOUS COMORBIDITY WITH BODY MASS INDEX (BMI) OF 45.0 TO 49.9 IN ADULT, UNSPECIFIED OBESITY TYPE: ICD-10-CM

## 2025-02-12 DIAGNOSIS — Z78.9 WEIGHT LOSS ADVISED: ICD-10-CM

## 2025-03-10 ENCOUNTER — OFFICE VISIT (OUTPATIENT)
Dept: PRIMARY CARE | Facility: CLINIC | Age: 40
End: 2025-03-10
Payer: COMMERCIAL

## 2025-03-10 VITALS
HEART RATE: 78 BPM | SYSTOLIC BLOOD PRESSURE: 124 MMHG | WEIGHT: 263 LBS | TEMPERATURE: 98 F | HEIGHT: 70 IN | OXYGEN SATURATION: 97 % | BODY MASS INDEX: 37.65 KG/M2 | DIASTOLIC BLOOD PRESSURE: 74 MMHG

## 2025-03-10 DIAGNOSIS — E78.1 HYPERTRIGLYCERIDEMIA: ICD-10-CM

## 2025-03-10 DIAGNOSIS — Z78.9 WEIGHT LOSS ADVISED: ICD-10-CM

## 2025-03-10 DIAGNOSIS — R74.8 ELEVATED LIVER ENZYMES: ICD-10-CM

## 2025-03-10 DIAGNOSIS — K21.9 GASTROESOPHAGEAL REFLUX DISEASE, UNSPECIFIED WHETHER ESOPHAGITIS PRESENT: ICD-10-CM

## 2025-03-10 DIAGNOSIS — E66.812 CLASS 2 OBESITY DUE TO EXCESS CALORIES WITHOUT SERIOUS COMORBIDITY WITH BODY MASS INDEX (BMI) OF 37.0 TO 37.9 IN ADULT: ICD-10-CM

## 2025-03-10 DIAGNOSIS — E66.09 CLASS 2 OBESITY DUE TO EXCESS CALORIES WITHOUT SERIOUS COMORBIDITY WITH BODY MASS INDEX (BMI) OF 37.0 TO 37.9 IN ADULT: ICD-10-CM

## 2025-03-10 DIAGNOSIS — Z00.00 ROUTINE ADULT HEALTH MAINTENANCE: Primary | ICD-10-CM

## 2025-03-10 PROCEDURE — 1036F TOBACCO NON-USER: CPT | Performed by: FAMILY MEDICINE

## 2025-03-10 PROCEDURE — 3008F BODY MASS INDEX DOCD: CPT | Performed by: FAMILY MEDICINE

## 2025-03-10 PROCEDURE — 99395 PREV VISIT EST AGE 18-39: CPT | Performed by: FAMILY MEDICINE

## 2025-03-10 RX ORDER — PANTOPRAZOLE SODIUM 40 MG/1
40 TABLET, DELAYED RELEASE ORAL DAILY
Qty: 90 TABLET | Refills: 0 | Status: SHIPPED | OUTPATIENT
Start: 2025-03-10 | End: 2025-06-08

## 2025-03-10 ASSESSMENT — PATIENT HEALTH QUESTIONNAIRE - PHQ9
2. FEELING DOWN, DEPRESSED OR HOPELESS: NOT AT ALL
1. LITTLE INTEREST OR PLEASURE IN DOING THINGS: NOT AT ALL
SUM OF ALL RESPONSES TO PHQ9 QUESTIONS 1 AND 2: 0

## 2025-03-10 ASSESSMENT — PROMIS GLOBAL HEALTH SCALE
CARRYOUT_PHYSICAL_ACTIVITIES: COMPLETELY
RATE_SOCIAL_SATISFACTION: VERY GOOD
RATE_PHYSICAL_HEALTH: GOOD
RATE_GENERAL_HEALTH: GOOD
RATE_AVERAGE_FATIGUE: MODERATE
RATE_MENTAL_HEALTH: VERY GOOD
CARRYOUT_SOCIAL_ACTIVITIES: VERY GOOD
RATE_AVERAGE_PAIN: 2
EMOTIONAL_PROBLEMS: RARELY
RATE_QUALITY_OF_LIFE: VERY GOOD

## 2025-03-10 ASSESSMENT — PAIN SCALES - GENERAL: PAINLEVEL_OUTOF10: 2

## 2025-03-10 NOTE — ASSESSMENT & PLAN NOTE
- I am very happy to hear that the medication has been tolerated  -Continue to focus on healthy, balanced diet with moderation of carbohydrates and regular physical activity

## 2025-03-10 NOTE — ASSESSMENT & PLAN NOTE
- Generally stable with plans to send prescription refill on pantoprazole to use if recurrent symptoms

## 2025-03-10 NOTE — PROGRESS NOTES
Outpatient Visit Note    Chief Complaint   Patient presents with    Annual Exam       HPI:  Kris Smith is a 39 y.o. male who presents to the office for annual well exam/medication follow-up He was last seen in the office in August 2024 for follow-up.    Last panel blood work completed on 10/19/2024 including CMP, TSH, vitamin B12 and lipase.  Blood work was generally unremarkable with normalized liver enzymes    Well Exam:  Overall, they describe their health as good with no reports of recent illness or hospitalization. Denies issues of chest pain, shortness of breath, headaches, vision/hearing changes, abdominal pain, vomiting, diarrhea, melena, hematochezia, constipation or urinary symptoms.    At prior encounter he reported ongoing struggles with his weight despite attempts at dietary modification, to which patient was ultimately initiated on Zepbound which has been systematically titrated to 15 mg daily.  Patient's weight and physical exam in April 2024 was 326, with noted 55 pound loss while utilizing medication. He reports to have had good success with starting medication denying any abdominal pain or nausea.  Notes improvement in his appetite with cravings curved.    Preventative Health Maintenance:  In regards to preventative health maintenance, last Tdap received unknown. Flu shot not typically received stating to have had an adverse reaction from the flu shot received in his teenage years. COVID-19 vaccine not received to date.    Current Medications  Current Outpatient Medications   Medication Instructions    hydrocortisone 1 % ointment Topical, 2 times daily    pantoprazole (PROTONIX) 40 mg, oral, Daily, Do not crush, chew, or split.    tirzepatide (weight loss) (ZEPBOUND) 15 mg, subcutaneous, Every 7 days        Allergies  No Known Allergies     Immunizations    There is no immunization history on file for this patient.     Past Medical History:   Diagnosis Date    Anxiety     Depression      Hypertension       History reviewed. No pertinent surgical history.  Family History   Problem Relation Name Age of Onset    Cancer Mother Zoila Smith     Hypertension Mother Zoila Smith     COPD Father Andrzej Smith     Depression Father Andrzej Smith      Social History     Tobacco Use    Smoking status: Former     Current packs/day: 0.00     Average packs/day: 1 pack/day for 10.0 years (10.0 ttl pk-yrs)     Types: Cigarettes     Start date: 2003     Quit date: 2013     Years since quittin.2    Smokeless tobacco: Never   Vaping Use    Vaping status: Never Used   Substance Use Topics    Alcohol use: Yes     Alcohol/week: 6.0 standard drinks of alcohol     Types: 4 Cans of beer, 2 Shots of liquor per week     Comment: Occasionally    Drug use: Never       ROS  All pertinent positive symptoms are included in the history of present illness.  All other systems have been reviewed and are negative and noncontributory to this patient's current ailments.    VITAL SIGNS  Vitals:    03/10/25 1458   BP: 124/74   Pulse: 78   Temp: 36.7 °C (98 °F)   SpO2: 97%       PHYSICAL EXAM  GENERAL APPEARANCE: alert and oriented, Pleasant and cooperative, No Acute Distress.   HEENT: EOMI, PERRLA, TMs intact and flat bilaterally, patent nares, normal oropharynx, MMM  NECK: no lymphadenopathy, no thyromegaly.   HEART: RRR, normal S1S2, no murmurs, click or rubs.   LUNGS: clear to auscultation bilaterally, no wheezes/rhonchi/rales.   ABDOMEN: soft, non-tender, no organomegaly, no masses palpated, no guarding or rigidity.   EXTREMITIES: no edema, normal ROM  SKIN: normal, no rash, unremarkable.   NEUROLOGIC EXAM: non-focal exam.   MUSCULOSKELETAL: no gross abnormalities.   PSYCH: affect is normal, eye contact is good.     Assessment/Plan   Problem List Items Addressed This Visit             ICD-10-CM    Hypertriglyceridemia E78.1     - Will assess cholesterol panel secondary to previous elevated triglycerides  -Continue  to focus on healthy, low-fat diet with moderation of carbohydrates         Relevant Orders    Comprehensive metabolic panel    Lipid panel    Elevated liver enzymes R74.8     - Will monitor liver enzymes with blood work ordered secondary to previous mild elevation         Relevant Orders    Comprehensive metabolic panel    Lipid panel    Adult BMI 45.0-49.9 kg/sq m (Multi) Z68.42     - I am very happy to hear that the medication has been tolerated  -Continue to focus on healthy, balanced diet with moderation of carbohydrates and regular physical activity         Relevant Medications    tirzepatide, weight loss, (Zepbound) 15 mg/0.5 mL injection    Gastroesophageal reflux disease K21.9     - Generally stable with plans to send prescription refill on pantoprazole to use if recurrent symptoms         Relevant Medications    pantoprazole (ProtoNix) 40 mg EC tablet     Other Visit Diagnoses         Codes    Routine adult health maintenance    -  Primary Z00.00    Weight loss advised     Z78.9    Relevant Medications    tirzepatide, weight loss, (Zepbound) 15 mg/0.5 mL injection            Additional Visit Plans:  - Complete history and physical examination was performed    GENERAL RECOMMENDATIONS:  - Complete review of history of physical exam completed today  - A healthy diet to maintain a normal BMI (under 25) to reduce heart disease, risk for diabetes encouraged.  - Exercising 150 minutes per week and eating healthy to reduce heart disease.  - Blood pressure screen completed.    BLOOD TESTING:  - Orders for fasting routine blood work given today, to be completed at your earliest convenience  - Will contact you with the blood work results once received and reviewed.    General Recommendations include:  - Cholesterol and diabetes screen if risk factors (overweight, high blood pressure).  - Sexually transmitted infections if risk factors.    VACCINATIONS RECOMMENDATIONS:  - Flu shot annually -declined seasonally  secondary to prior reaction  - Tetanus booster every 10 years - advocated  - Pneumonia vaccination starting at 65 years old (or earlier if risk factors - smoker, diabetic, heart or lung conditions) -not due yet  - Shingles vaccine for those 50 years or older - check with your insurance for SHINGRIX coverage and get it at your local pharmacy -not due yet  -COVID-19 vaccine series advocated    SCREENINGS RECOMMENDATIONS:  -Colon cancer screening (with colonoscopy or Cologuard) for men and women starting at age 45 until 74 years old - not due yet      Counseling:       Medication education:         Education:  The patient is counseled regarding potential side-effects of all new medications        Understanding:  Patient expressed understanding        Adherence:  No barriers to adherence identified      ** Please excuse any errors in grammar or translation related to this dictation. Voice recognition software was utilized to prepare this document. **

## 2025-03-10 NOTE — PATIENT INSTRUCTIONS
Problem List Items Addressed This Visit             ICD-10-CM    Hypertriglyceridemia E78.1     - Will assess cholesterol panel secondary to previous elevated triglycerides  -Continue to focus on healthy, low-fat diet with moderation of carbohydrates         Relevant Orders    Comprehensive metabolic panel    Lipid panel    Elevated liver enzymes R74.8     - Will monitor liver enzymes with blood work ordered secondary to previous mild elevation         Relevant Orders    Comprehensive metabolic panel    Lipid panel    Adult BMI 45.0-49.9 kg/sq m (Multi) Z68.42     - I am very happy to hear that the medication has been tolerated  -Continue to focus on healthy, balanced diet with moderation of carbohydrates and regular physical activity         Relevant Medications    tirzepatide, weight loss, (Zepbound) 15 mg/0.5 mL injection    Gastroesophageal reflux disease K21.9     - Generally stable with plans to send prescription refill on pantoprazole to use if recurrent symptoms         Relevant Medications    pantoprazole (ProtoNix) 40 mg EC tablet     Other Visit Diagnoses         Codes    Routine adult health maintenance    -  Primary Z00.00    Weight loss advised     Z78.9    Relevant Medications    tirzepatide, weight loss, (Zepbound) 15 mg/0.5 mL injection            Additional Visit Plans:  - Complete history and physical examination was performed    GENERAL RECOMMENDATIONS:  - Complete review of history of physical exam completed today  - A healthy diet to maintain a normal BMI (under 25) to reduce heart disease, risk for diabetes encouraged.  - Exercising 150 minutes per week and eating healthy to reduce heart disease.  - Blood pressure screen completed.    BLOOD TESTING:  - Orders for fasting routine blood work given today, to be completed at your earliest convenience  - Will contact you with the blood work results once received and reviewed.    General Recommendations include:  - Cholesterol and diabetes screen if  risk factors (overweight, high blood pressure).  - Sexually transmitted infections if risk factors.    VACCINATIONS RECOMMENDATIONS:  - Flu shot annually -declined seasonally secondary to prior reaction  - Tetanus booster every 10 years - advocated  - Pneumonia vaccination starting at 65 years old (or earlier if risk factors - smoker, diabetic, heart or lung conditions) -not due yet  - Shingles vaccine for those 50 years or older - check with your insurance for SH[employer]X coverage and get it at your local pharmacy -not due yet  -COVID-19 vaccine series advocated    SCREENINGS RECOMMENDATIONS:  -Colon cancer screening (with colonoscopy or Cologuard) for men and women starting at age 45 until 74 years old - not due yet      Counseling:       Medication education:         Education:  The patient is counseled regarding potential side-effects of all new medications        Understanding:  Patient expressed understanding        Adherence:  No barriers to adherence identified      ** Please excuse any errors in grammar or translation related to this dictation. Voice recognition software was utilized to prepare this document. **